# Patient Record
Sex: FEMALE | Race: WHITE | NOT HISPANIC OR LATINO | URBAN - METROPOLITAN AREA
[De-identification: names, ages, dates, MRNs, and addresses within clinical notes are randomized per-mention and may not be internally consistent; named-entity substitution may affect disease eponyms.]

---

## 2021-08-26 ENCOUNTER — OFFICE VISIT (OUTPATIENT)
Dept: URBAN - METROPOLITAN AREA CLINIC 33 | Facility: CLINIC | Age: 46
End: 2021-08-26
Payer: COMMERCIAL

## 2021-08-26 DIAGNOSIS — H53.032 STRABISMIC AMBLYOPIA, LEFT EYE: ICD-10-CM

## 2021-08-26 DIAGNOSIS — H52.4 PRESBYOPIA: Primary | ICD-10-CM

## 2021-08-26 PROCEDURE — 92002 INTRM OPH EXAM NEW PATIENT: CPT | Performed by: OPTOMETRIST

## 2021-08-26 ASSESSMENT — INTRAOCULAR PRESSURE
OD: 12
OS: 12

## 2021-08-26 ASSESSMENT — VISUAL ACUITY
OS: 20/70
OD: 20/25

## 2021-08-26 ASSESSMENT — KERATOMETRY
OS: 44.88
OD: 44.63

## 2021-08-26 NOTE — IMPRESSION/PLAN
Impression: Presbyopia: H52.4. Plan: Educated patient about today's findings. Order refraction to determine patient's best corrected visual acuity as it relates to presbyopia- dispensed Rx to patient. First time progressive wearer. Patient was educated about 1-2 week adaptation period with new Rx.

## 2021-08-26 NOTE — IMPRESSION/PLAN
Impression: Strabismic amblyopia, left eye: H53.032. Plan: Constant left esotropia since childhood. No treatment at this time. Patients understands decreased BCVA OS.

## 2024-08-14 ENCOUNTER — OFFICE VISIT (OUTPATIENT)
Dept: URBAN - METROPOLITAN AREA CLINIC 33 | Facility: CLINIC | Age: 49
End: 2024-08-14
Payer: COMMERCIAL

## 2024-08-14 DIAGNOSIS — H53.032 STRABISMIC AMBLYOPIA, LEFT EYE: ICD-10-CM

## 2024-08-14 DIAGNOSIS — H52.4 PRESBYOPIA: Primary | ICD-10-CM

## 2024-08-14 PROCEDURE — 92014 COMPRE OPH EXAM EST PT 1/>: CPT

## 2024-08-14 ASSESSMENT — INTRAOCULAR PRESSURE
OD: 12
OS: 12

## 2024-08-14 ASSESSMENT — VISUAL ACUITY
OD: 20/25
OS: 20/50